# Patient Record
Sex: FEMALE | Race: BLACK OR AFRICAN AMERICAN | NOT HISPANIC OR LATINO | URBAN - METROPOLITAN AREA
[De-identification: names, ages, dates, MRNs, and addresses within clinical notes are randomized per-mention and may not be internally consistent; named-entity substitution may affect disease eponyms.]

---

## 2020-11-03 ENCOUNTER — CLAIMS CREATED FROM THE CLAIM WINDOW (OUTPATIENT)
Dept: URBAN - METROPOLITAN AREA CLINIC 94 | Facility: CLINIC | Age: 45
End: 2020-11-03
Payer: COMMERCIAL

## 2020-11-03 ENCOUNTER — WEB ENCOUNTER (OUTPATIENT)
Dept: URBAN - METROPOLITAN AREA CLINIC 94 | Facility: CLINIC | Age: 45
End: 2020-11-03

## 2020-11-03 VITALS
TEMPERATURE: 98.2 F | DIASTOLIC BLOOD PRESSURE: 84 MMHG | HEIGHT: 69 IN | WEIGHT: 232 LBS | HEART RATE: 88 BPM | BODY MASS INDEX: 34.36 KG/M2 | SYSTOLIC BLOOD PRESSURE: 141 MMHG

## 2020-11-03 DIAGNOSIS — K92.1 RECTAL BLEEDING: ICD-10-CM

## 2020-11-03 DIAGNOSIS — R19.4 CHANGE IN BOWEL HABITS: ICD-10-CM

## 2020-11-03 DIAGNOSIS — R19.7 DIARRHEA: ICD-10-CM

## 2020-11-03 DIAGNOSIS — K21.9 GERD: ICD-10-CM

## 2020-11-03 PROBLEM — 235595009 GASTROESOPHAGEAL REFLUX DISEASE: Status: ACTIVE | Noted: 2020-11-03

## 2020-11-03 PROCEDURE — G8417 CALC BMI ABV UP PARAM F/U: HCPCS | Performed by: INTERNAL MEDICINE

## 2020-11-03 PROCEDURE — 99204 OFFICE O/P NEW MOD 45 MIN: CPT | Performed by: INTERNAL MEDICINE

## 2020-11-03 PROCEDURE — G8427 DOCREV CUR MEDS BY ELIG CLIN: HCPCS | Performed by: INTERNAL MEDICINE

## 2020-11-03 PROCEDURE — G8484 FLU IMMUNIZE NO ADMIN: HCPCS | Performed by: INTERNAL MEDICINE

## 2020-11-03 PROCEDURE — G9903 PT SCRN TBCO ID AS NON USER: HCPCS | Performed by: INTERNAL MEDICINE

## 2020-11-03 PROCEDURE — 1036F TOBACCO NON-USER: CPT | Performed by: INTERNAL MEDICINE

## 2020-11-03 RX ORDER — AMLODIPINE BESYLATE 10 MG/1
1 TABLET TABLET ORAL ONCE A DAY
Status: ACTIVE | COMMUNITY

## 2020-11-03 RX ORDER — SERTRALINE HYDROCHLORIDE 50 MG/1
1 TABLET TABLET, FILM COATED ORAL ONCE A DAY
Status: ACTIVE | COMMUNITY

## 2020-11-03 NOTE — HPI-TODAY'S VISIT:
46 yo female dx with COVID 3 mos ago. She states that following the diagnosis she has developed changes in bowel habits with increased bowel frequency 7-8 soft/loose stools/day with urgency. She denies abdominal pain or weight loss. She reports a 2 yr hx of trace blood on toilet paper with occasional rectal itching. She denies rectal pain/pressure. The patient assumes this is from .   The patient reports a paternal aunt with colorectal cancer. She denies a 1st degree relative. The patient denies recent infxn or fever.  The patient also reports a hx of acid reflux which has also been worse after COVID

## 2020-11-06 LAB
ACCA: 294
ALCA: 44
AMCA: 57
ATYPICAL PANCA: NEGATIVE
C-REACTIVE PROTEIN, QUANT: 10
COMMENTS: (no result)
GASCA: 29
TSH: 1.09

## 2020-12-03 ENCOUNTER — LAB OUTSIDE AN ENCOUNTER (OUTPATIENT)
Dept: URBAN - METROPOLITAN AREA CLINIC 94 | Facility: CLINIC | Age: 45
End: 2020-12-03

## 2020-12-04 ENCOUNTER — OFFICE VISIT (OUTPATIENT)
Dept: URBAN - METROPOLITAN AREA SURGERY CENTER 17 | Facility: SURGERY CENTER | Age: 45
End: 2020-12-04
Payer: COMMERCIAL

## 2020-12-04 DIAGNOSIS — K64.8 BLEEDING INTERNAL HEMORRHOIDS: ICD-10-CM

## 2020-12-04 DIAGNOSIS — K62.5 ANAL BLEEDING: ICD-10-CM

## 2020-12-04 PROCEDURE — 45378 DIAGNOSTIC COLONOSCOPY: CPT | Performed by: INTERNAL MEDICINE

## 2020-12-04 PROCEDURE — G9937 DIG OR SURV COLSCO: HCPCS | Performed by: INTERNAL MEDICINE

## 2020-12-04 PROCEDURE — G8907 PT DOC NO EVENTS ON DISCHARG: HCPCS | Performed by: INTERNAL MEDICINE

## 2020-12-09 LAB
ACCA: (no result)
ALCA: (no result)
AMCA: (no result)
ATYPICAL PANCA: (no result)
C-REACTIVE PROTEIN, QUANT: (no result)
CALPROTECTIN, FECAL: 248
COMMENTS: (no result)
GASCA: (no result)
REQUEST PROBLEM: (no result)
TSH: (no result)

## 2020-12-10 ENCOUNTER — TELEPHONE ENCOUNTER (OUTPATIENT)
Dept: URBAN - METROPOLITAN AREA CLINIC 92 | Facility: CLINIC | Age: 45
End: 2020-12-10

## 2020-12-10 PROBLEM — 34000006 CROHN DISEASE: Status: ACTIVE | Noted: 2020-12-10

## 2020-12-10 PROBLEM — 14760008 CONSTIPATION: Status: ACTIVE | Noted: 2020-12-10

## 2020-12-10 PROBLEM — 71833008 CROHN'S DISEASE OF SMALL AND LARGE INTESTINES: Status: ACTIVE | Noted: 2020-12-10

## 2020-12-28 ENCOUNTER — OFFICE VISIT (OUTPATIENT)
Dept: URBAN - METROPOLITAN AREA CLINIC 94 | Facility: CLINIC | Age: 45
End: 2020-12-28
Payer: COMMERCIAL

## 2020-12-28 ENCOUNTER — DASHBOARD ENCOUNTERS (OUTPATIENT)
Age: 45
End: 2020-12-28

## 2020-12-28 VITALS
HEART RATE: 78 BPM | HEIGHT: 69 IN | TEMPERATURE: 97.7 F | SYSTOLIC BLOOD PRESSURE: 127 MMHG | WEIGHT: 235 LBS | DIASTOLIC BLOOD PRESSURE: 81 MMHG | BODY MASS INDEX: 34.8 KG/M2

## 2020-12-28 DIAGNOSIS — K92.1 RECTAL BLEEDING: ICD-10-CM

## 2020-12-28 DIAGNOSIS — R19.7 DIARRHEA: ICD-10-CM

## 2020-12-28 PROCEDURE — G8484 FLU IMMUNIZE NO ADMIN: HCPCS | Performed by: PHYSICIAN ASSISTANT

## 2020-12-28 PROCEDURE — G8484 FLU IMMUNIZE NO ADMIN: HCPCS | Performed by: INTERNAL MEDICINE

## 2020-12-28 PROCEDURE — G9903 PT SCRN TBCO ID AS NON USER: HCPCS | Performed by: PHYSICIAN ASSISTANT

## 2020-12-28 PROCEDURE — G9903 PT SCRN TBCO ID AS NON USER: HCPCS | Performed by: INTERNAL MEDICINE

## 2020-12-28 PROCEDURE — 99213 OFFICE O/P EST LOW 20 MIN: CPT | Performed by: INTERNAL MEDICINE

## 2020-12-28 PROCEDURE — 99213 OFFICE O/P EST LOW 20 MIN: CPT | Performed by: PHYSICIAN ASSISTANT

## 2020-12-28 PROCEDURE — G8417 CALC BMI ABV UP PARAM F/U: HCPCS | Performed by: PHYSICIAN ASSISTANT

## 2020-12-28 PROCEDURE — G8417 CALC BMI ABV UP PARAM F/U: HCPCS | Performed by: INTERNAL MEDICINE

## 2020-12-28 PROCEDURE — G8427 DOCREV CUR MEDS BY ELIG CLIN: HCPCS | Performed by: PHYSICIAN ASSISTANT

## 2020-12-28 PROCEDURE — G8427 DOCREV CUR MEDS BY ELIG CLIN: HCPCS | Performed by: INTERNAL MEDICINE

## 2020-12-28 RX ORDER — SERTRALINE HYDROCHLORIDE 50 MG/1
1 TABLET TABLET, FILM COATED ORAL ONCE A DAY
Status: ACTIVE | COMMUNITY

## 2020-12-28 RX ORDER — AMLODIPINE BESYLATE 10 MG/1
1 TABLET TABLET ORAL ONCE A DAY
Status: ACTIVE | COMMUNITY

## 2020-12-28 NOTE — HPI-TODAY'S VISIT:
Pt returns for post procedure f/u - Colonoscopy 12/4/20 reveals diverticulosis and IH. No Bx taken. Labs revealed abnormal IBD panel and fecal calprotecin 248 and normal CRP. CT enterography - no acute abnormalities. diverticulosis, lrg uterine fibroid. Pt reports her sx have improved. No further loose stools. BMs are back to normal   Patient reports sx began following COVID 3 mos ago. She reports changes in bowel habits with increased bowel frequency 7-8 soft/loose stools/day with urgency. She denies abdominal pain or weight loss. She reports a 2 yr hx of trace blood on toilet paper with occasional rectal itching. She denies rectal pain/pressure. The patient assumes this is from IH.   The patient reports a paternal aunt with colorectal cancer. She denies a 1st degree relative. The patient denies recent infxn or fever.  The patient also reports a hx of acid reflux which has also been worse after COVID

## 2021-06-23 ENCOUNTER — OFFICE VISIT (OUTPATIENT)
Dept: URBAN - METROPOLITAN AREA CLINIC 94 | Facility: CLINIC | Age: 46
End: 2021-06-23

## 2021-06-23 RX ORDER — SERTRALINE HYDROCHLORIDE 50 MG/1
1 TABLET TABLET, FILM COATED ORAL ONCE A DAY
Status: ACTIVE | COMMUNITY

## 2021-06-23 RX ORDER — AMLODIPINE BESYLATE 10 MG/1
1 TABLET TABLET ORAL ONCE A DAY
Status: ACTIVE | COMMUNITY

## 2021-06-28 ENCOUNTER — OFFICE VISIT (OUTPATIENT)
Dept: URBAN - METROPOLITAN AREA CLINIC 94 | Facility: CLINIC | Age: 46
End: 2021-06-28

## 2023-08-28 ENCOUNTER — P2P PATIENT RECORD (OUTPATIENT)
Age: 48
End: 2023-08-28